# Patient Record
Sex: FEMALE | Race: WHITE | NOT HISPANIC OR LATINO
[De-identification: names, ages, dates, MRNs, and addresses within clinical notes are randomized per-mention and may not be internally consistent; named-entity substitution may affect disease eponyms.]

---

## 2019-06-14 ENCOUNTER — APPOINTMENT (OUTPATIENT)
Dept: OTOLARYNGOLOGY | Facility: CLINIC | Age: 39
End: 2019-06-14
Payer: COMMERCIAL

## 2019-06-14 VITALS
TEMPERATURE: 98.9 F | HEART RATE: 81 BPM | RESPIRATION RATE: 16 BRPM | OXYGEN SATURATION: 99 % | DIASTOLIC BLOOD PRESSURE: 79 MMHG | SYSTOLIC BLOOD PRESSURE: 118 MMHG

## 2019-06-14 DIAGNOSIS — Z82.3 FAMILY HISTORY OF STROKE: ICD-10-CM

## 2019-06-14 DIAGNOSIS — F17.200 NICOTINE DEPENDENCE, UNSPECIFIED, UNCOMPLICATED: ICD-10-CM

## 2019-06-14 DIAGNOSIS — R13.14 DYSPHAGIA, PHARYNGOESOPHAGEAL PHASE: ICD-10-CM

## 2019-06-14 DIAGNOSIS — H61.23 IMPACTED CERUMEN, BILATERAL: ICD-10-CM

## 2019-06-14 DIAGNOSIS — R07.0 PAIN IN THROAT: ICD-10-CM

## 2019-06-14 PROCEDURE — 99203 OFFICE O/P NEW LOW 30 MIN: CPT | Mod: 25

## 2019-06-14 PROCEDURE — 69210 REMOVE IMPACTED EAR WAX UNI: CPT

## 2019-06-14 PROCEDURE — 31575 DIAGNOSTIC LARYNGOSCOPY: CPT

## 2019-06-14 RX ORDER — SULFAMETHOXAZOLE AND TRIMETHOPRIM 800; 160 MG/1; MG/1
800-160 TABLET ORAL
Qty: 20 | Refills: 0 | Status: ACTIVE | COMMUNITY
Start: 2019-06-14 | End: 1900-01-01

## 2019-06-14 NOTE — PHYSICAL EXAM
[Midline] : trachea located in midline position [Normal] : no rashes [de-identified] : 3+ with mild discharge [de-identified] : b copious cerumen imapctions rmeoved atreauamtically with curet

## 2019-06-14 NOTE — PROCEDURE
[Unable to Cooperate with Mirror] : patient unable to cooperate with mirror [Dysphagia] : dysphagia not clearly evaluated by indirect laryngoscopy [Topical Lidocaine] : topical lidocaine [Oxymetazoline HCl] : oxymetazoline HCl [Serial Number: ___] : Serial Number: [unfilled] [Flexible Endoscope] : examined with the flexible endoscope [Normal] : posterior cricoid area had healthy pink mucosa in the interarytenoid area and the esophageal inlet [de-identified] : for severe dysphagia - only tonsillitis seen [Cerumen Impaction] : Cerumen Impaction [Same] : same as the Pre Op Dx. [FreeTextEntry6] : b copious cerumen impactions removed atraumatically with curet [] : Removal of Cerumen

## 2019-06-14 NOTE — HISTORY OF PRESENT ILLNESS
[de-identified] : 39 yo woman with 4 yo daughter who gets recurrent strep throat 1 mo ago had t 103 and sore throat - went to urgent care and given augmentin and tested negative for flu and mono- had recurrence and went back to er and got medrol and it helped - she finished it 2 d ago and her throat feels moderate constant irritation. no more fevers. smoker

## 2019-06-17 LAB — BACTERIA THROAT CULT: NORMAL

## 2019-07-03 ENCOUNTER — APPOINTMENT (OUTPATIENT)
Dept: OTOLARYNGOLOGY | Facility: CLINIC | Age: 39
End: 2019-07-03
Payer: COMMERCIAL

## 2019-07-03 VITALS
DIASTOLIC BLOOD PRESSURE: 79 MMHG | HEART RATE: 91 BPM | SYSTOLIC BLOOD PRESSURE: 111 MMHG | TEMPERATURE: 98.3 F | OXYGEN SATURATION: 99 %

## 2019-07-03 DIAGNOSIS — J35.03 CHRONIC TONSILLITIS AND ADENOIDITIS: ICD-10-CM

## 2019-07-03 PROCEDURE — 99213 OFFICE O/P EST LOW 20 MIN: CPT

## 2019-07-03 NOTE — PHYSICAL EXAM
[de-identified] : 2+symmetric not inflamed [Normal] : no masses and lesions seen, face is symmetric

## 2019-07-03 NOTE — REASON FOR VISIT
[FreeTextEntry2] : chronic tonsillitis and adenoiditis [Subsequent Evaluation] : a subsequent evaluation for

## 2019-07-03 NOTE — HISTORY OF PRESENT ILLNESS
[de-identified] : followup 38 yo woman with chronic tonsillitis and adenoiditis- started after ebv-  she said she has had complete testing recently and all was negative.after taking bactrim, her tonsils feel just about lawrence to normal. no pain or drianage. minmal swelling. -f.sc. does not feel ill. Her insurance does not allow surgery in [facility]>

## 2021-10-06 PROBLEM — J35.03 CHRONIC TONSILLITIS AND ADENOIDITIS: Status: ACTIVE | Noted: 2019-06-14
